# Patient Record
Sex: FEMALE | Race: AMERICAN INDIAN OR ALASKA NATIVE
[De-identification: names, ages, dates, MRNs, and addresses within clinical notes are randomized per-mention and may not be internally consistent; named-entity substitution may affect disease eponyms.]

---

## 2019-03-10 ENCOUNTER — HOSPITAL ENCOUNTER (EMERGENCY)
Dept: HOSPITAL 5 - ED | Age: 34
Discharge: HOME | End: 2019-03-10
Payer: COMMERCIAL

## 2019-03-10 ENCOUNTER — HOSPITAL ENCOUNTER (INPATIENT)
Dept: HOSPITAL 5 - ED | Age: 34
LOS: 3 days | Discharge: HOME | DRG: 175 | End: 2019-03-13
Attending: INTERNAL MEDICINE | Admitting: INTERNAL MEDICINE
Payer: COMMERCIAL

## 2019-03-10 VITALS — DIASTOLIC BLOOD PRESSURE: 56 MMHG | SYSTOLIC BLOOD PRESSURE: 114 MMHG

## 2019-03-10 DIAGNOSIS — Z71.6: ICD-10-CM

## 2019-03-10 DIAGNOSIS — J20.9: ICD-10-CM

## 2019-03-10 DIAGNOSIS — I26.99: Primary | ICD-10-CM

## 2019-03-10 DIAGNOSIS — J96.00: ICD-10-CM

## 2019-03-10 DIAGNOSIS — Z82.49: ICD-10-CM

## 2019-03-10 DIAGNOSIS — J20.9: Primary | ICD-10-CM

## 2019-03-10 DIAGNOSIS — D72.829: ICD-10-CM

## 2019-03-10 DIAGNOSIS — J68.3: ICD-10-CM

## 2019-03-10 DIAGNOSIS — Z79.899: ICD-10-CM

## 2019-03-10 DIAGNOSIS — F17.200: ICD-10-CM

## 2019-03-10 LAB
ALBUMIN SERPL-MCNC: 3.8 G/DL (ref 3.9–5)
ALT SERPL-CCNC: 17 UNITS/L (ref 7–56)
APTT BLD: 28.8 SEC. (ref 24.2–36.6)
BASOPHILS # (AUTO): 0 K/MM3 (ref 0–0.1)
BASOPHILS NFR BLD AUTO: 0.8 % (ref 0–1.8)
BUN SERPL-MCNC: 8 MG/DL (ref 7–17)
BUN/CREAT SERPL: 10 %
CALCIUM SERPL-MCNC: 9 MG/DL (ref 8.4–10.2)
EOSINOPHIL # BLD AUTO: 0.2 K/MM3 (ref 0–0.4)
EOSINOPHIL NFR BLD AUTO: 2.7 % (ref 0–4.3)
HCT VFR BLD CALC: 42.8 % (ref 30.3–42.9)
HEMOLYSIS INDEX: 14
HGB BLD-MCNC: 14.7 GM/DL (ref 10.1–14.3)
INR PPP: 0.97 (ref 0.87–1.13)
LYMPHOCYTES # BLD AUTO: 2.5 K/MM3 (ref 1.2–5.4)
LYMPHOCYTES NFR BLD AUTO: 42.1 % (ref 13.4–35)
MCHC RBC AUTO-ENTMCNC: 34 % (ref 30–34)
MCV RBC AUTO: 94 FL (ref 79–97)
MONOCYTES # (AUTO): 0.4 K/MM3 (ref 0–0.8)
MONOCYTES % (AUTO): 6.1 % (ref 0–7.3)
PLATELET # BLD: 251 K/MM3 (ref 140–440)
RBC # BLD AUTO: 4.54 M/MM3 (ref 3.65–5.03)

## 2019-03-10 PROCEDURE — 94640 AIRWAY INHALATION TREATMENT: CPT

## 2019-03-10 PROCEDURE — 85610 PROTHROMBIN TIME: CPT

## 2019-03-10 PROCEDURE — 94760 N-INVAS EAR/PLS OXIMETRY 1: CPT

## 2019-03-10 PROCEDURE — 80048 BASIC METABOLIC PNL TOTAL CA: CPT

## 2019-03-10 PROCEDURE — 81001 URINALYSIS AUTO W/SCOPE: CPT

## 2019-03-10 PROCEDURE — 85730 THROMBOPLASTIN TIME PARTIAL: CPT

## 2019-03-10 PROCEDURE — 71045 X-RAY EXAM CHEST 1 VIEW: CPT

## 2019-03-10 PROCEDURE — 93005 ELECTROCARDIOGRAM TRACING: CPT

## 2019-03-10 PROCEDURE — 84484 ASSAY OF TROPONIN QUANT: CPT

## 2019-03-10 PROCEDURE — 99406 BEHAV CHNG SMOKING 3-10 MIN: CPT

## 2019-03-10 PROCEDURE — 36415 COLL VENOUS BLD VENIPUNCTURE: CPT

## 2019-03-10 PROCEDURE — 80053 COMPREHEN METABOLIC PANEL: CPT

## 2019-03-10 PROCEDURE — 96366 THER/PROPH/DIAG IV INF ADDON: CPT

## 2019-03-10 PROCEDURE — 93010 ELECTROCARDIOGRAM REPORT: CPT

## 2019-03-10 PROCEDURE — 85379 FIBRIN DEGRADATION QUANT: CPT

## 2019-03-10 PROCEDURE — 99284 EMERGENCY DEPT VISIT MOD MDM: CPT

## 2019-03-10 PROCEDURE — 85025 COMPLETE CBC W/AUTO DIFF WBC: CPT

## 2019-03-10 PROCEDURE — 94644 CONT INHLJ TX 1ST HOUR: CPT

## 2019-03-10 PROCEDURE — 81025 URINE PREGNANCY TEST: CPT

## 2019-03-10 PROCEDURE — 83880 ASSAY OF NATRIURETIC PEPTIDE: CPT

## 2019-03-10 PROCEDURE — 93970 EXTREMITY STUDY: CPT

## 2019-03-10 PROCEDURE — 96375 TX/PRO/DX INJ NEW DRUG ADDON: CPT

## 2019-03-10 PROCEDURE — 71275 CT ANGIOGRAPHY CHEST: CPT

## 2019-03-10 PROCEDURE — 96365 THER/PROPH/DIAG IV INF INIT: CPT

## 2019-03-10 NOTE — EMERGENCY DEPARTMENT REPORT
ED General Adult HPI





- General


Chief complaint: Chest Pain


Stated complaint: CHEST PAIN/LIGHT HEADED


Time Seen by Provider: 03/10/19 13:10


Source: patient


Mode of arrival: Ambulatory


Limitations: No Limitations





- History of Present Illness


Initial comments: 


33-year-old female with cough which is largely nonproductive for the last week. 

She complains of intermittent shortness of breath.  She has obvious wheezing.  

She states that she has not been treated for asthma in the past.  She denies 

risk factors for pulmonary embolism such as recent travel.  He said no leg pain 

or swelling.  She's had some occasional chest discomfort which is associated 

with her wheezing and transient in nature.  She did not relate any pleuritic 

pain to me.  She denies hemoptysis or even any productive sputum.  She denies 

fever or chills.





-: week(s)


Location: chest


Radiation: non-radiation


Quality: aching


Consistency: intermittent (transient and intermittent)


Improves with: none


Worsens with: none


Associated Symptoms: cough, shortness of breath


Treatments Prior to Arrival: none





- Related Data


                                  Previous Rx's











 Medication  Instructions  Recorded  Last Taken  Type


 


Amoxicillin/K Clav Tab [Augmentin 1 tab PO Q12HR #14 tab 10/26/18 Unknown Rx





875 mg]    


 


Ibuprofen [Motrin] 800 mg PO Q8HR #30 tablet 10/26/18 Unknown Rx


 


Albuterol Sulfate [Ventolin HFA] 2 puff IH Q4H PRN #1 hfa.aer.ad 03/10/19 U

nknown Rx


 


Azithromycin [Zithromax] 250 mg PO DAILY #6 tablet 03/10/19 Unknown Rx


 


predniSONE [Deltasone] 60 mg PO QDAY #15 tab 03/10/19 Unknown Rx











                                    Allergies











Allergy/AdvReac Type Severity Reaction Status Date / Time


 


No Known Allergies Allergy   Verified 03/10/19 12:54














ED Review of Systems


ROS: 


Stated complaint: CHEST PAIN/LIGHT HEADED


Other details as noted in HPI





Constitutional: denies: chills, fever


Eyes: denies: eye pain, eye discharge, vision change


ENT: denies: ear pain, throat pain


Respiratory: cough, shortness of breath, wheezing


Cardiovascular: chest pain.  denies: palpitations


Endocrine: no symptoms reported


Gastrointestinal: denies: abdominal pain, nausea, diarrhea


Genitourinary: denies: urgency, dysuria, discharge


Musculoskeletal: as per HPI.  denies: back pain, joint swelling, arthralgia, 

myalgia


Skin: denies: rash, lesions


Neurological: denies: headache, weakness, paresthesias


Psychiatric: denies: anxiety, depression


Hematological/Lymphatic: denies: easy bleeding, easy bruising





ED Past Medical Hx





- Past Medical History


Previous Medical History?: No





- Surgical History


Past Surgical History?: No





- Social History


Smoking Status: Current Every Day Smoker


Substance Use Type: None





- Medications


Home Medications: 


                                Home Medications











 Medication  Instructions  Recorded  Confirmed  Last Taken  Type


 


Amoxicillin/K Clav Tab [Augmentin 1 tab PO Q12HR #14 tab 10/26/18  Unknown Rx





875 mg]     


 


Ibuprofen [Motrin] 800 mg PO Q8HR #30 tablet 10/26/18  Unknown Rx


 


Albuterol Sulfate [Ventolin HFA] 2 puff IH Q4H PRN #1 hfa.aer.ad 03/10/19  

Unknown Rx


 


Azithromycin [Zithromax] 250 mg PO DAILY #6 tablet 03/10/19  Unknown Rx


 


predniSONE [Deltasone] 60 mg PO QDAY #15 tab 03/10/19  Unknown Rx














ED Physical Exam





- General


Limitations: No Limitations


General appearance: alert, in no apparent distress





- Head


Head exam: Present: atraumatic, normocephalic





- Eye


Eye exam: Present: normal appearance.  Absent: scleral icterus





- ENT


ENT exam: Present: mucous membranes moist





- Neck


Neck exam: Present: normal inspection.  Absent: tenderness, meningismus





- Respiratory


Respiratory exam: Present: wheezes.  Absent: normal lung sounds bilaterally, 

respiratory distress





- Cardiovascular


Cardiovascular Exam: Present: regular rate, normal rhythm.  Absent: systolic 

murmur, diastolic murmur, rubs, gallop





- GI/Abdominal


GI/Abdominal exam: Present: soft, normal bowel sounds.  Absent: distended, 

tenderness, guarding, rebound, rigid





- Extremities Exam


Extremities exam: Present: normal inspection





- Back Exam


Back exam: Present: normal inspection





- Neurological Exam


Neurological exam: Present: alert, oriented X3, CN II-XII intact.  Absent: motor

 sensory deficit





- Psychiatric


Psychiatric exam: Present: normal affect, normal mood





- Skin


Skin exam: Present: warm, dry, intact, normal color.  Absent: rash





ED Course


                                   Vital Signs











  03/10/19 03/10/19 03/10/19





  13:11 13:55 14:23


 


Temperature 97.9 F  


 


Pulse Rate 115 H  


 


Pulse Rate [  97 H 





Anterior   





Bilateral   





Throughout]   


 


Respiratory 22  24





Rate   


 


Respiratory  20 





Rate [Anterior   





Bilateral   





Throughout]   


 


Blood Pressure 119/61  


 


O2 Sat by Pulse 95  





Oximetry   














- Reevaluation(s)


Reevaluation #1: 


Patient denies chest pain independent from her wheezing.  She didn't experience 

any chest pain while here.  I do not think she qualifies for CT imaging with the

d-dimer measurement found.  Her wheezing has improved and her symptoms have 

essentially resolved.  She has minimal residual wheezing but does not desire 

another treatment.  She states ready for discharge.


03/10/19 15:19








ED Medical Decision Making





- Lab Data


Result diagrams: 


                                 03/10/19 13:53





                                 03/10/19 13:41








                         Laboratory Results - last 24 hr











  03/10/19 03/10/19 03/10/19





  13:41 13:41 13:53


 


WBC    5.8


 


RBC    4.54


 


Hgb    14.7 H


 


Hct    42.8


 


MCV    94


 


MCH    32


 


MCHC    34


 


RDW    13.0 L


 


Plt Count    251


 


Lymph % (Auto)    42.1 H


 


Mono % (Auto)    6.1


 


Eos % (Auto)    2.7


 


Baso % (Auto)    0.8


 


Lymph #    2.5


 


Mono #    0.4


 


Eos #    0.2


 


Baso #    0.0


 


Seg Neutrophils %    48.3


 


Seg Neutrophils #    2.8


 


PT  13.5  


 


INR  0.97  


 


APTT  28.8  


 


D-Dimer  247.77 H  


 


Sodium   140 


 


Potassium   3.9 


 


Chloride   104.9 


 


Carbon Dioxide   25 


 


Anion Gap   14 


 


BUN   8 


 


Creatinine   0.8 


 


Estimated GFR   > 60 


 


BUN/Creatinine Ratio   10 


 


Glucose   93 


 


Calcium   9.0 


 


Total Bilirubin   0.30 


 


AST   15 


 


ALT   17 


 


Alkaline Phosphatase   60 


 


Troponin T   < 0.010 


 


Total Protein   6.7 


 


Albumin   3.8 L 


 


Albumin/Globulin Ratio   1.3 














  03/10/19





  13:53


 


WBC 


 


RBC 


 


Hgb 


 


Hct 


 


MCV 


 


MCH 


 


MCHC 


 


RDW 


 


Plt Count 


 


Lymph % (Auto) 


 


Mono % (Auto) 


 


Eos % (Auto) 


 


Baso % (Auto) 


 


Lymph # 


 


Mono # 


 


Eos # 


 


Baso # 


 


Seg Neutrophils % 


 


Seg Neutrophils # 


 


PT 


 


INR 


 


APTT 


 


D-Dimer  245.44 H


 


Sodium 


 


Potassium 


 


Chloride 


 


Carbon Dioxide 


 


Anion Gap 


 


BUN 


 


Creatinine 


 


Estimated GFR 


 


BUN/Creatinine Ratio 


 


Glucose 


 


Calcium 


 


Total Bilirubin 


 


AST 


 


ALT 


 


Alkaline Phosphatase 


 


Troponin T 


 


Total Protein 


 


Albumin 


 


Albumin/Globulin Ratio 














- EKG Data


-: EKG Interpreted by Me


EKG shows normal: sinus rhythm, axis, intervals, QRS complexes, ST-T waves


Rate: tachycardia





- EKG Data


Interpretation: nonspecific ST-T wave danilo





- Radiology Data


Radiology results: report reviewed


Chest x-ray no acute process





Critical care attestation.: 


If time is entered above; I have spent that time in minutes in the direct care 

of this critically ill patient, excluding procedure time.








ED Disposition


Clinical Impression: 


 Reactive airways dysfunction syndrome





Acute bronchitis


Qualifiers:


 Bronchitis organism: unspecified organism Qualified Code(s): J20.9 - Acute 

bronchitis, unspecified





Disposition: DC-01 TO HOME OR SELFCARE


Is pt being admited?: No


Does the pt Need Aspirin: No


Condition: Stable


Instructions:  Bronchospasm (ED), Acute Bronchitis (ED)


Additional Instructions: 


Return to the emergency department for any acute change or worsening symptoms.  

Follow-up with primary care provider.  Rx has directed.


Prescriptions: 


predniSONE [Deltasone] 60 mg PO QDAY #15 tab


Albuterol Sulfate [Ventolin HFA] 2 puff IH Q4H PRN #1 hfa.aer.ad


 PRN Reason: Shortness Of Breath


Azithromycin [Zithromax] 250 mg PO DAILY #6 tablet


Referrals: 


[facility], [place] [Primary Care Provider] - 2-3 Days


Time of Disposition: 15:21

## 2019-03-10 NOTE — EMERGENCY DEPARTMENT REPORT
Blank Doc





- Documentation


Documentation: 





This is a 33-year-old female that presents with shortness of breathe and chest 

pain.  





Sat at 93-94% in triage.





This initial assessment/diagnostic orders/clinical plan/treatment(s) is/are 

subject to change based on patient's health status, clinical progression and 

re-assessment by fellow clinical providers in the ED.  Further treatment and 

workup at subsequent clinical providers discretion.  Patient/guardians urged not

to elope from the ED as their condition may be serious if not clinically 

assessed and managed.  Initial orders include:


1- Patient sent to MAIN for further evaluation and treatment


2- Breathing treatment/steroids


3- Labs


4- EKG


5- CXR

## 2019-03-10 NOTE — EMERGENCY DEPARTMENT REPORT
HPI





- General


Chief Complaint: Dyspnea/Respdistress


Time Seen by Provider: 03/10/19 23:40





- HPI


HPI: 





33 -American female presents to the ED with shortness of breath, 

wheezing, that started 1 hour prior to arrival.  Patient rates symptoms as 

severe in severity.  Patient was seen earlier in the ED and was diagnosed with 

bronchitis, she states she was walking in to [facility] to get her prescriptions and 

suddenly felt out of breath, very fatigued, chest discomfort, and like she was 

going to pass out.  She drove herself to ED, parked in front of the ambulance 

entrance, and proceeded to the ED where she was met by nurses, and promptly 

placed in to a room.  She appeared to be in respiratory distress, heart rate in 

the 140s, sats in the low 90s, and the wheezing in all lobes.  Respiratory was 

called, patient was placed in the DuoNeb 10 mg albuterol and 1 mg Atrovent, she 

was given Solu-Medrol 125 IV, and placed on 2 L of oxygen.





ED Past Medical Hx





- Social History


Smoking Status: Current Every Day Smoker


Substance Use Type: None





- Medications


Home Medications: 


                                Home Medications











 Medication  Instructions  Recorded  Confirmed  Last Taken  Type


 


Amoxicillin/K Clav Tab [Augmentin 1 tab PO Q12HR #14 tab 10/26/18 03/11/19 

Unknown Rx





875 mg]     


 


Ibuprofen [Motrin] 800 mg PO Q8HR #30 tablet 10/26/18 03/11/19 Unknown Rx


 


Albuterol Sulfate [Ventolin HFA] 2 puff IH Q4H PRN #1 hfa.aer.ad 03/10/19 

03/11/19 Unknown Rx


 


Azithromycin [Zithromax] 250 mg PO DAILY #6 tablet 03/10/19 03/11/19 Unknown Rx


 


predniSONE [Deltasone] 60 mg PO QDAY #15 tab 03/10/19 03/11/19 Unknown Rx














ED Review of Systems


ROS: 


Stated complaint: YONNY


Other details as noted in HPI








ED Medical Decision Making





- Lab Data


Result diagrams: 


                                 03/11/19 00:48





                                 03/11/19 00:48





- Medical Decision Making





33-year-old female presents to the ED with respiratory distress





Treated with DuoNeb with 10 mg of albuterol and 1 mg of Atrovent, 2 mg IV of 

magnesium, CT angiogram of the chest showed right lower lobe PE, patient treated

 with Lovenox 100 mg subcutaneous 1.  Patient To be admitted to ICU.





- Differential Diagnosis


ACS, PE, pneumonia, bronchitis


Critical Care Time: Yes (one hour)


Critical care time in (mins) excluding proc time.: 60


Critical care attestation.: 


If time is entered above; I have spent that time in minutes in the direct care 

of this critically ill patient, excluding procedure time.








ED Disposition


Clinical Impression: 


Pulmonary embolism


Qualifiers:


 Pulmonary embolism type: other Chronicity: acute Acute cor pulmonale presence: 

without acute cor pulmonale Qualified Code(s): I26.99 - Other pulmonary embolism

 without acute cor pulmonale





Disposition: DC-09 OP ADMIT IP TO THIS HOSP


Is pt being admited?: Yes


Does the pt Need Aspirin: Yes


Condition: Critical

## 2019-03-10 NOTE — XRAY REPORT
EXAM:   XR CHEST 1V AP 

 

HISTORY:  YONNY 

 

TECHNIQUE: PA and lateral chest x-ray dated 3/10/2019 at 1:50 PM. 

 

COMPARISON:  None available. 

 

FINDINGS: 

 

The heart size and mediastinum are within normal limits.  The lung fields and costophrenic angles are
 clear.  There is no acute parenchymal infiltrate, pleural effusion, or pneumothorax seen.  The visua
lized bony structures are within normal limits. 

 

IMPRESSION: 

 

1.  No evidence for acute cardiopulmonary disease seen. 

 

  

 

This document is electronically signed by Yolanda Parsons MD., March 10 2019 02:15:18 PM ET

## 2019-03-11 LAB
APTT BLD: 26.7 SEC. (ref 24.2–36.6)
BACTERIA #/AREA URNS HPF: (no result) /HPF
BASOPHILS # (AUTO): 0 K/MM3 (ref 0–0.1)
BASOPHILS NFR BLD AUTO: 0.2 % (ref 0–1.8)
BILIRUB UR QL STRIP: (no result)
BLOOD UR QL VISUAL: (no result)
BUN SERPL-MCNC: 8 MG/DL (ref 7–17)
BUN/CREAT SERPL: 11 %
CALCIUM SERPL-MCNC: 8.9 MG/DL (ref 8.4–10.2)
EOSINOPHIL # BLD AUTO: 0 K/MM3 (ref 0–0.4)
EOSINOPHIL NFR BLD AUTO: 0 % (ref 0–4.3)
HCT VFR BLD CALC: 43 % (ref 30.3–42.9)
HEMOLYSIS INDEX: 13
HGB BLD-MCNC: 14.8 GM/DL (ref 10.1–14.3)
INR PPP: 0.92 (ref 0.87–1.13)
LYMPHOCYTES # BLD AUTO: 1.1 K/MM3 (ref 1.2–5.4)
LYMPHOCYTES NFR BLD AUTO: 9.9 % (ref 13.4–35)
MCHC RBC AUTO-ENTMCNC: 34 % (ref 30–34)
MCV RBC AUTO: 95 FL (ref 79–97)
MONOCYTES # (AUTO): 0.2 K/MM3 (ref 0–0.8)
MONOCYTES % (AUTO): 1.8 % (ref 0–7.3)
MUCOUS THREADS #/AREA URNS HPF: (no result) /HPF
PH UR STRIP: 6 [PH] (ref 5–7)
PLATELET # BLD: 280 K/MM3 (ref 140–440)
PROT UR STRIP-MCNC: (no result) MG/DL
RBC # BLD AUTO: 4.54 M/MM3 (ref 3.65–5.03)
RBC #/AREA URNS HPF: 3 /HPF (ref 0–6)
UROBILINOGEN UR-MCNC: < 2 MG/DL (ref ?–2)
WBC #/AREA URNS HPF: 1 /HPF (ref 0–6)

## 2019-03-11 PROCEDURE — 5A09357 ASSISTANCE WITH RESPIRATORY VENTILATION, LESS THAN 24 CONSECUTIVE HOURS, CONTINUOUS POSITIVE AIRWAY PRESSURE: ICD-10-PCS | Performed by: INTERNAL MEDICINE

## 2019-03-11 RX ADMIN — MORPHINE SULFATE PRN MG: 2 INJECTION, SOLUTION INTRAMUSCULAR; INTRAVENOUS at 09:19

## 2019-03-11 RX ADMIN — MORPHINE SULFATE PRN MG: 2 INJECTION, SOLUTION INTRAMUSCULAR; INTRAVENOUS at 18:42

## 2019-03-11 RX ADMIN — ENOXAPARIN SODIUM SCH MG: 100 INJECTION SUBCUTANEOUS at 06:15

## 2019-03-11 RX ADMIN — IPRATROPIUM BROMIDE SCH MG: 0.5 SOLUTION RESPIRATORY (INHALATION) at 13:53

## 2019-03-11 RX ADMIN — Medication SCH ML: at 22:08

## 2019-03-11 RX ADMIN — IPRATROPIUM BROMIDE SCH MG: 0.5 SOLUTION RESPIRATORY (INHALATION) at 09:51

## 2019-03-11 RX ADMIN — ENOXAPARIN SODIUM SCH MG: 100 INJECTION SUBCUTANEOUS at 18:42

## 2019-03-11 RX ADMIN — Medication SCH ML: at 09:20

## 2019-03-11 RX ADMIN — IPRATROPIUM BROMIDE SCH MG: 0.5 SOLUTION RESPIRATORY (INHALATION) at 16:39

## 2019-03-11 RX ADMIN — IPRATROPIUM BROMIDE SCH: 0.5 SOLUTION RESPIRATORY (INHALATION) at 21:55

## 2019-03-11 NOTE — HISTORY AND PHYSICAL REPORT
History of Present Illness


Date of examination: 03/11/19


History of present illness: 


33 -year-old woman with no medical problem, she is a  comes 

emergency room with complaints of shortness of breath and a nonproductive cough.

 She was seen out in the emergency room yesterday and was diagnosed with acute 

bronchitis.  She went to [facility] to  her prednisone, she became increasing 

more short of breath, feel as if she was, pass out.  She comes emergency room 

her evaluation, she was found to be in respiratory distress, wheezing, she will 

space on BiPAP, given steroids.  She denies OCP use, recent surgery, she is 

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


Review of systems


Constitutional: no  weight loss, chills, fever


Ears, eyes, nose, mouth and throat: no nasal congestion, no nasal discharge, no 

sinus pressure, no vision change, no red eye.


Neck: No neck pain or rigidity.


Cardiovascular: no palpitations, chest pain


Respiratory: + cough, shortness of breath


Gastrointestinal: no hematochezia, abdominal pain


Genitourinary : no  frequency , no hematuria


Musculoskeletal: no joint swelling or muscle ache 


Integumentary: no rash, no pruritis


Neurological: no parathesias, no focal weakness


Endocrine: no cold or heat intolerance, no polyuria or polydipsia


Hematologic/Lymphatic: no easy bruising, no easy bleeding, no gland swelling


Allergic/Immunologic: no urticaria, no angioedema.





PAST MEDICAL HISTORY: None





PAST SURGICAL HISTORY: Hernia repair





SOCIAL HISTORY: Denies alcohol,  drugs, smokes half pack a day





FAMILY HISTORY: Hypertension








Medications and Allergies


                                    Allergies











Allergy/AdvReac Type Severity Reaction Status Date / Time


 


No Known Allergies Allergy   Verified 03/10/19 12:54











                                Home Medications











 Medication  Instructions  Recorded  Confirmed  Last Taken  Type


 


Amoxicillin/K Clav Tab [Augmentin 1 tab PO Q12HR #14 tab 10/26/18 03/11/19 

Unknown Rx





875 mg]     


 


Ibuprofen [Motrin] 800 mg PO Q8HR #30 tablet 10/26/18 03/11/19 Unknown Rx


 


Albuterol Sulfate [Ventolin HFA] 2 puff IH Q4H PRN #1 hfa.aer.ad 03/10/19 

03/11/19 Unknown Rx


 


Azithromycin [Zithromax] 250 mg PO DAILY #6 tablet 03/10/19 03/11/19 Unknown Rx


 


predniSONE [Deltasone] 60 mg PO QDAY #15 tab 03/10/19 03/11/19 Unknown Rx














Exam





- Physical Exam


Narrative exam: 


General Apperance: The patient lying in bed,  breathing comfortable on BIPAP


HEENT: Normocephalic, atraumatic.  Pupils equally round and reactive to light, 

EOMI, no sclericterus or JVD or thyromegaly or nodule.  , no carotid bruit, 

mucous membranes moist, no exudate or erythema


Heart: S1-S2, regular is rhythm


Lungs: Clear to auscultation bilaterally, breathing comfortable


Abdomen: Positive bowel sounds, soft, nontender, nondistended, no organomegaly


Extremities: No edema cyanosis clubbing


Skin: no rash, nodule, warm and dry


Neuro: cranial nerves 2-12 intact, speech is fluent, motor/sensory intact








- Constitutional


Vitals: 


                                        











Temp Pulse Resp BP Pulse Ox


 


    114 H  22   113/54   100 


 


    03/11/19 02:25  03/11/19 02:25  03/11/19 02:00  03/11/19 02:12














Results





- Labs


CBC & Chem 7: 


                                 03/11/19 00:48





                                 03/11/19 00:48


Labs: 


                              Abnormal lab results











  03/11/19 03/11/19 Range/Units





  00:48 00:48 


 


Hgb  14.8 H   (10.1-14.3)  gm/dl


 


Hct  43.0 H   (30.3-42.9)  %


 


MCH  33 H   (28-32)  pg


 


Lymph % (Auto)  9.9 L   (13.4-35.0)  %


 


Lymph #  1.1 L   (1.2-5.4)  K/mm3


 


Seg Neutrophils %  88.1 H   (40.0-70.0)  %


 


Seg Neutrophils #  9.5 H   (1.8-7.7)  K/mm3


 


Sodium   136 L  (137-145)  mmol/L


 


Carbon Dioxide   18 L D  (22-30)  mmol/L


 


Glucose   169 H  ()  mg/dL














- Imaging and Cardiology


CT scan - chest: report reviewed





Assessment and Plan


Assessment


Acute pulmonary emboli


Acute bronchitis


Plan


Admit to medicine


Start Lovenox, check Doppler lower extremity


start Steroids, nebulized treatments


DVT prophylaxis

## 2019-03-11 NOTE — VASCULAR LAB REPORT
PROCEDURE: VL VENOUS DUPLEX LE BILAT 

 

TECHNIQUE:  Duplex Doppler imaging of the veins of the bilateral lower extremities was performed. 

 

HISTORY: DVT 

 

COMPARISONS: None.  

 

FINDINGS: 

 

The veins of the right lower extremity are patent, compressible, demonstrate normal waveforms and aug
mentation. 

 

The veins of the left lower extremity are patent, compressible, demonstrate normal waveforms and augm
entation. 

 

IMPRESSION:  

 

No evidence of deep venous fibrosis of the bilateral lower extremities. 

 

This document is electronically signed by Sheridan Beltrán MD., March 11 2019 12:23:13 PM ET

## 2019-03-11 NOTE — CONSULTATION
History of Present Illness


Consult date: 03/11/19


Reason for consult: dyspnea, cough, pulmonary embolism


History of present illness: 





                                   PULMONARY AND CRITICAL CARE CONSULTATION





DR. SOUZA THANK YOU FOR ASKING US TO PARTICIPATE  IN THE CARE OF THIS PATIENT.








33 -year-old woman with no medical problem, she is a  comes 

emergency room with complaints of shortness of breath and a nonproductive cough.

 She was seen out in the emergency room yesterday and was diagnosed with acute 

bronchitis.  She went to [facility] to  her prednisone, she became increasing 

more short of breath, feel as if she was, pass out.  She comes emergency room 

her evaluation, she was found to be in respiratory distress, wheezing, she is 

placed on BiPAP and given steroids.  She denies OCP use, recent surgery, she is 

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 

truck.Patient denies any other medical problems except migraine headache. She 

smokes Half a pack a day x 10 years. Counselled to stop smoking.Denies alcohol 

or drug abuse. No known drug allergies. Not . No children. Do not use 

Birth control pills.


Patient has Angio CT of chest  reported pulmonary emboli right Lower lobe.


Patient started on S/C Lovenox with therapeutic dose.


Patient resting on 2 litres O2. O2 saturation 98%.





Past History


Past Medical History: other (Migrain hedaches.)





Medications and Allergies


                                    Allergies











Allergy/AdvReac Type Severity Reaction Status Date / Time


 


No Known Allergies Allergy   Verified 03/10/19 12:54











                                Home Medications











 Medication  Instructions  Recorded  Confirmed  Last Taken  Type


 


Amoxicillin/K Clav Tab [Augmentin 1 tab PO Q12HR #14 tab 10/26/18 03/11/19 

Unknown Rx





875 mg]     


 


Ibuprofen [Motrin] 800 mg PO Q8HR #30 tablet 10/26/18 03/11/19 Unknown Rx


 


Albuterol Sulfate [Ventolin HFA] 2 puff IH Q4H PRN #1 hfa.aer.ad 03/10/19 

03/11/19 Unknown Rx


 


Azithromycin [Zithromax] 250 mg PO DAILY #6 tablet 03/10/19 03/11/19 Unknown Rx


 


predniSONE [Deltasone] 60 mg PO QDAY #15 tab 03/10/19 03/11/19 Unknown Rx











Active Meds: 


Active Medications





Acetaminophen (Tylenol)  650 mg PO Q4H PRN


   PRN Reason: Pain MILD(1-3)/Fever >100.5/HA


   Last Admin: 03/11/19 12:20 Dose:  650 mg


   Documented by: 


Enoxaparin Sodium (Lovenox)  90 mg 1 mg/kg (90 mg) SUB-Q Q12H [facility]


   Last Admin: 03/11/19 06:15 Dose:  90 mg


   Documented by: 


Ipratropium Bromide (Atrovent)  0.5 mg IH Q4HRT [facility]


   Last Admin: 03/11/19 16:39 Dose:  0.5 mg


   Documented by: 


Methylprednisolone Sodium Succinate (Solu-Medrol)  80 mg IV Q6HR [facility]


   Last Admin: 03/11/19 12:19 Dose:  80 mg


   Documented by: 


Morphine Sulfate (Morphine)  2 mg IV Q4H PRN


   PRN Reason: Pain, Moderate (4-6)


   Last Admin: 03/11/19 09:19 Dose:  2 mg


   Documented by: 


Ondansetron HCl (Zofran)  4 mg IV Q8H PRN


   PRN Reason: Nausea And Vomiting


Sodium Chloride (Sodium Chloride Flush Syringe 10 Ml)  10 ml IV BID [facility]


   Last Admin: 03/11/19 09:20 Dose:  10 ml


   Documented by: 


Sodium Chloride (Sodium Chloride Flush Syringe 10 Ml)  10 ml IV PRN PRN


   PRN Reason: LINE FLUSH











Review of Systems


All systems: negative





Physical Examination


Vital signs: 


                                   Vital Signs











Pulse Pulse Ox


 


 156 H  96 


 


 03/10/19 23:28  03/10/19 23:28











General appearance: no acute distress, asleep


Eyes: non-icteric


ENT: oropharynx moist


Neck: supple, no JVD


Ascultation: Bilateral: diminished breath sounds


Cardiovascular: regular rate and rhythm


Gastrointestinal: normoactive bowel sounds, soft, non-tender


Integumentary: normal


Extremities: no cyanosis, no edema


Musculoskeletal: no deformities


non-focal exam, pupils equal and round, CN II-XII normal


depressed





Results





- Laboratory Findings


CBC and BMP: 


                                 03/11/19 00:48





                                 03/11/19 00:48


PT/INR, D-dimer











PT  12.9 Sec. (12.2-14.9)   03/11/19  04:31    


 


INR  0.92  (0.87-1.13)   03/11/19  04:31    








Abnormal lab findings: 


                                  Abnormal Labs











  03/11/19 03/11/19





  00:48 00:48


 


Hgb  14.8 H 


 


Hct  43.0 H 


 


MCH  33 H 


 


Lymph % (Auto)  9.9 L 


 


Lymph #  1.1 L 


 


Seg Neutrophils %  88.1 H 


 


Seg Neutrophils #  9.5 H 


 


Sodium   136 L


 


Carbon Dioxide   18 L D


 


Glucose   169 H














- Diagnostic Findings


CT scan - chest: report reviewed (Angio CT of chest reported right lower lobe 

pulmonary embolism.), image reviewed


[place]: report reviewed (No DVT reported.)





Assessment and Plan





33 -year-old woman with no medical problem, she is a  comes 

emergency room with complaints of shortness of breath and a nonproductive cough.

  She was seen out in the emergency room yesterday and was diagnosed with acute 

bronchitis.  She went to [facility] to  her prednisone, she became increasing mo

re short of breath, feel as if she was, pass out.  She comes emergency room her 

evaluation, she was found to be in respiratory distress, wheezing, she is placed

 on BiPAP and given steroids.  She denies OCP use, recent surgery, she is 

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 

truck.Patient denies any other medical problems except migraine headache. She 

smokes Half a pack a day x 10 years. Counselled to stop smoking.Denies alcohol 

or drug abuse. No known drug allergies. Not . No children. Do not use 

Birth control pills.


Patient has Angio CT of chest  reported pulmonary emboli right Lower lobe.


Patient started on S/C Lovenox with therapeutic dose.


Patient resting on 2 litres O2. O2 saturation 98%.





- Patient Problems


(1) Pulmonary embolism


Current Visit: Yes   Status: Acute   


Qualifiers: 


   Pulmonary embolism type: other   Chronicity: acute   Acute cor pulmonale 

presence: without acute cor pulmonale   Qualified Code(s): I26.99 - Other 

pulmonary embolism without acute cor pulmonale   


Plan to address problem: 


Patient started on S/C Lovenox.








(2) Reactive airways dysfunction syndrome


Current Visit: No   Status: Acute   


Plan to address problem: 


Atrovent aerosol treatments q 6 hours.


 Continue I/V solumedrol.

## 2019-03-11 NOTE — CONSULTATION
History of Present Illness


Consult date: 03/11/19


Requesting physician: KIM SOUZA


Reason for consult: other (Acute Hypoxemic Respiratory Failure)


History of present illness: 





PCCM CONSULT NOTE (Full note dictated # )





Please see dictated notes for full details





Medications and Allergies


                                    Allergies











Allergy/AdvReac Type Severity Reaction Status Date / Time


 


No Known Allergies Allergy   Verified 03/10/19 12:54











                                Home Medications











 Medication  Instructions  Recorded  Confirmed  Last Taken  Type


 


Amoxicillin/K Clav Tab [Augmentin 1 tab PO Q12HR #14 tab 10/26/18 03/11/19 

Unknown Rx





875 mg]     


 


Ibuprofen [Motrin] 800 mg PO Q8HR #30 tablet 10/26/18 03/11/19 Unknown Rx


 


Albuterol Sulfate [Ventolin HFA] 2 puff IH Q4H PRN #1 hfa.aer.ad 03/10/19 

03/11/19 Unknown Rx


 


Azithromycin [Zithromax] 250 mg PO DAILY #6 tablet 03/10/19 03/11/19 Unknown Rx


 


predniSONE [Deltasone] 60 mg PO QDAY #15 tab 03/10/19 03/11/19 Unknown Rx











Active Meds: 


Active Medications





Acetaminophen (Tylenol)  650 mg PO Q4H PRN


   PRN Reason: Pain MILD(1-3)/Fever >100.5/HA


Enoxaparin Sodium (Lovenox)  90 mg 1 mg/kg (90 mg) SUB-Q Q12H [facility]


   Last Admin: 03/11/19 06:15 Dose:  90 mg


   Documented by: 


Ipratropium Bromide (Atrovent)  0.5 mg IH Q4HRT [facility]


   Last Admin: 03/11/19 09:51 Dose:  0.5 mg


   Documented by: 


Methylprednisolone Sodium Succinate (Solu-Medrol)  80 mg IV Q6HR [facility]


Morphine Sulfate (Morphine)  2 mg IV Q4H PRN


   PRN Reason: Pain, Moderate (4-6)


   Last Admin: 03/11/19 09:19 Dose:  2 mg


   Documented by: 


Ondansetron HCl (Zofran)  4 mg IV Q8H PRN


   PRN Reason: Nausea And Vomiting


Sodium Chloride (Sodium Chloride Flush Syringe 10 Ml)  10 ml IV BID [facility]


   Last Admin: 03/11/19 09:20 Dose:  10 ml


   Documented by: 


Sodium Chloride (Sodium Chloride Flush Syringe 10 Ml)  10 ml IV PRN PRN


   PRN Reason: LINE FLUSH











Physical Examination


Vital signs: 


                                   Vital Signs











Pulse Pulse Ox


 


 156 H  96 


 


 03/10/19 23:28  03/10/19 23:28














Results





- Laboratory Findings


CBC and BMP: 


                                 03/11/19 00:48





                                 03/11/19 00:48


PT/INR, D-dimer











PT  12.9 Sec. (12.2-14.9)   03/11/19  04:31    


 


INR  0.92  (0.87-1.13)   03/11/19  04:31    








Abnormal lab findings: 


                                  Abnormal Labs











  03/11/19 03/11/19





  00:48 00:48


 


Hgb  14.8 H 


 


Hct  43.0 H 


 


MCH  33 H 


 


Lymph % (Auto)  9.9 L 


 


Lymph #  1.1 L 


 


Seg Neutrophils %  88.1 H 


 


Seg Neutrophils #  9.5 H 


 


Sodium   136 L


 


Carbon Dioxide   18 L D


 


Glucose   169 H

## 2019-03-11 NOTE — CAT SCAN REPORT
PROCEDURE: CT ANGIO CHEST 

 

TECHNIQUE: 

 

HISTORY: chest pain,elev ddimer,sob 

 

FINDINGS: Contrast-enhanced CT angiography of the chest was performed following the intravenous admin
istration of iodinated contrast. Sagittal and coronal MIP three-dimensional reformatted images were g
enerated. 

 

These images demonstrate a pulmonary embolism in a medial branch of the right lower lobe pulmonary ar
[place], axial image 68, sagittal image 69, coronal image 68. No central embolism is seen. There is no a
ortic dissection. 

 

There is no consolidative pulmonary infiltrate. There is no pleural or pericardial effusion. 

 

In the upper abdomen the adrenal glands are within normal limits. The visualized portion of the liver
 and spleen are unremarkable. 

 

IMPRESSION: Right lower lobe pulmonary embolism 

 

This document is electronically signed by Johnson Oscar MD., March 11 2019 04:12:48 AM ET

## 2019-03-12 LAB
BASOPHILS # (AUTO): 0 K/MM3 (ref 0–0.1)
BASOPHILS NFR BLD AUTO: 0.1 % (ref 0–1.8)
BUN SERPL-MCNC: 14 MG/DL (ref 7–17)
BUN/CREAT SERPL: 20 %
CALCIUM SERPL-MCNC: 8.8 MG/DL (ref 8.4–10.2)
EOSINOPHIL # BLD AUTO: 0 K/MM3 (ref 0–0.4)
EOSINOPHIL NFR BLD AUTO: 0 % (ref 0–4.3)
HCT VFR BLD CALC: 43.1 % (ref 30.3–42.9)
HEMOLYSIS INDEX: 6
HGB BLD-MCNC: 14.2 GM/DL (ref 10.1–14.3)
LYMPHOCYTES # BLD AUTO: 1.6 K/MM3 (ref 1.2–5.4)
LYMPHOCYTES NFR BLD AUTO: 9.9 % (ref 13.4–35)
MCHC RBC AUTO-ENTMCNC: 33 % (ref 30–34)
MCV RBC AUTO: 95 FL (ref 79–97)
MONOCYTES # (AUTO): 0.4 K/MM3 (ref 0–0.8)
MONOCYTES % (AUTO): 2.7 % (ref 0–7.3)
PLATELET # BLD: 263 K/MM3 (ref 140–440)
RBC # BLD AUTO: 4.55 M/MM3 (ref 3.65–5.03)

## 2019-03-12 RX ADMIN — Medication SCH ML: at 21:44

## 2019-03-12 RX ADMIN — ENOXAPARIN SODIUM SCH MG: 100 INJECTION SUBCUTANEOUS at 17:07

## 2019-03-12 RX ADMIN — IPRATROPIUM BROMIDE SCH: 0.5 SOLUTION RESPIRATORY (INHALATION) at 00:44

## 2019-03-12 RX ADMIN — IPRATROPIUM BROMIDE SCH MG: 0.5 SOLUTION RESPIRATORY (INHALATION) at 09:12

## 2019-03-12 RX ADMIN — IPRATROPIUM BROMIDE SCH: 0.5 SOLUTION RESPIRATORY (INHALATION) at 16:07

## 2019-03-12 RX ADMIN — Medication SCH ML: at 10:59

## 2019-03-12 RX ADMIN — IPRATROPIUM BROMIDE SCH MG: 0.5 SOLUTION RESPIRATORY (INHALATION) at 21:16

## 2019-03-12 RX ADMIN — IPRATROPIUM BROMIDE SCH MG: 0.5 SOLUTION RESPIRATORY (INHALATION) at 13:34

## 2019-03-12 RX ADMIN — ENOXAPARIN SODIUM SCH MG: 100 INJECTION SUBCUTANEOUS at 06:04

## 2019-03-12 RX ADMIN — MORPHINE SULFATE PRN MG: 2 INJECTION, SOLUTION INTRAMUSCULAR; INTRAVENOUS at 06:37

## 2019-03-12 RX ADMIN — IPRATROPIUM BROMIDE SCH: 0.5 SOLUTION RESPIRATORY (INHALATION) at 03:32

## 2019-03-12 NOTE — PROGRESS NOTE
Assessment and Plan





Patient awake. Resting on 4 litres O2. O2 saturation 91%.No complaint of chest 

pain or shortness of breath. Patient afebrile and has slight cough.





- Patient Problems


(1) Pulmonary embolism


Current Visit: Yes   Status: Acute   


Qualifiers: 


   Pulmonary embolism type: other   Chronicity: acute   Acute cor pulmonale 

presence: without acute cor pulmonale   Qualified Code(s): I26.99 - Other 

pulmonary embolism without acute cor pulmonale   


Plan to address problem: 


Patient started on S/C Lovenox.








(2) Reactive airways dysfunction syndrome


Current Visit: No   Status: Acute   


Plan to address problem: 


Atrovent aerosol treatments q 6 hours.


 Continue I/V solumedrol.








Subjective


Date of service: 03/12/19


Interval history: 





Patient awake. Resting on 4 litres O2. O2 saturation 91%.No complaint of chest 

pain or shortness of breath. Patient afebrile and has slight cough.





Objective


                               Vital Signs - 12hr











  03/12/19 03/12/19 03/12/19





  07:41 08:00 09:27


 


Temperature 98.7 F  


 


Pulse Rate 92 H  


 


Pulse Rate [   





Anterior   





Bilateral   





Throughout]   


 


Pulse Rate [  118 H 106 H





Posterior   





Bilateral   





Throughout]   


 


Respiratory 18  





Rate   


 


Respiratory   





Rate [Anterior   





Bilateral   





Throughout]   


 


Respiratory  18 18





Rate [Posterior   





Bilateral   





Throughout]   


 


Blood Pressure 102/48  


 


O2 Sat by Pulse 89  





Oximetry   














  03/12/19 03/12/19 03/12/19





  09:30 10:00 10:19


 


Temperature   


 


Pulse Rate  93 H 


 


Pulse Rate [   





Anterior   





Bilateral   





Throughout]   


 


Pulse Rate [   





Posterior   





Bilateral   





Throughout]   


 


Respiratory   





Rate   


 


Respiratory   





Rate [Anterior   





Bilateral   





Throughout]   


 


Respiratory   





Rate [Posterior   





Bilateral   





Throughout]   


 


Blood Pressure   


 


O2 Sat by Pulse 95  98





Oximetry   














  03/12/19 03/12/19 03/12/19





  11:52 13:38 13:44


 


Temperature 98.7 F  


 


Pulse Rate 86  


 


Pulse Rate [   103 H





Anterior   





Bilateral   





Throughout]   


 


Pulse Rate [  101 H 





Posterior   





Bilateral   





Throughout]   


 


Respiratory 18  





Rate   


 


Respiratory   18





Rate [Anterior   





Bilateral   





Throughout]   


 


Respiratory  17 





Rate [Posterior   





Bilateral   





Throughout]   


 


Blood Pressure 101/49  


 


O2 Sat by Pulse 96  





Oximetry   














  03/12/19





  16:04


 


Temperature 98.1 F


 


Pulse Rate 101 H


 


Pulse Rate [ 





Anterior 





Bilateral 





Throughout] 


 


Pulse Rate [ 





Posterior 





Bilateral 





Throughout] 


 


Respiratory 18





Rate 


 


Respiratory 





Rate [Anterior 





Bilateral 





Throughout] 


 


Respiratory 





Rate [Posterior 





Bilateral 





Throughout] 


 


Blood Pressure 100/51


 


O2 Sat by Pulse 91





Oximetry 











Constitutional: no acute distress, alert


Eyes: non-icteric


ENT: oropharynx moist


Neck: supple, no JVD


Ascultation: Bilateral: diminished breath sounds


Cardiovascular: regular rate and rhythm


Gastrointestinal: normoactive bowel sounds, soft, non-tender


Integumentary: normal


Extremities: no cyanosis, no edema


Neurologic: normal mental status, non-focal exam, pupils equal and round, CN II-

XII normal


Psychiatric: depressed


CBC and BMP: 


                                 03/12/19 06:05





                                 03/12/19 06:05


ABG, PT/INR, D-dimer: 


PT/INR, D-dimer











PT  12.9 Sec. (12.2-14.9)   03/11/19  04:31    


 


INR  0.92  (0.87-1.13)   03/11/19  04:31    








Abnormal lab findings: 


                                  Abnormal Labs











  03/11/19 03/11/19 03/12/19





  00:48 00:48 06:05


 


WBC    16.2 H


 


Hgb  14.8 H  


 


Hct  43.0 H   43.1 H


 


MCH  33 H  


 


Lymph % (Auto)  9.9 L   9.9 L


 


Lymph #  1.1 L  


 


Seg Neutrophils %  88.1 H   87.3 H


 


Seg Neutrophils #  9.5 H   14.1 H


 


Sodium   136 L 


 


Carbon Dioxide   18 L D 


 


Glucose   169 H 














  03/12/19





  06:05


 


WBC 


 


Hgb 


 


Hct 


 


MCH 


 


Lymph % (Auto) 


 


Lymph # 


 


Seg Neutrophils % 


 


Seg Neutrophils # 


 


Sodium  135 L


 


Carbon Dioxide 


 


Glucose  112 H

## 2019-03-12 NOTE — PROGRESS NOTE
Assessment and Plan


Assessment and plan: 


33 -year-old woman with no medical problem, she is a  comes 

emergency room with complaints of shortness of breath and a nonproductive cough.

 She was seen out in the emergency room yesterday and was diagnosed with acute 

bronchitis.  She went to [facility] to  her prednisone, she became increasing 

more short of breath, feel as if she was, pass out.  She comes emergency room 

her evaluation, she was found to be in respiratory distress, wheezing, she will 

space on BiPAP, given steroids.  She denies OCP use,, recent surgery, she is 

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


She was diagnosed With Pulmonary Embolism.








CT CHEST IMPRESSION: Right lower lobe pulmonary embolism 


Doppler US. IMPRESSION: No evidence of deep venous fibrosis of the bilateral 

lower extremities. 








Acute pulmonary emboli


Acute Respiratory failure secondary to Pulmonary embolisim


Reactive airways dysfunction syndrome


Acute bronchitis


Tobacco use disorder


Leukocytosis-likely secondary to steroids








Plan


Admit to medicine


Start Lovenox, 


Pulmonary input noted


started Steroids, nebulizer treatments


Based on clinical improvement and oxygen demand will switch to oral NOAC and 

plan for discharge


Patient will need outpatient follow up with Pulmonary, Primary doctor and 

Hematologist


DVT prophylaxis














History


Interval history: 


Patient seen and examined, still with some shortness of breath and exertion 

dyspnea.








Hospitalist Physical





- Physical exam


Narrative exam: 


General Apperance: The patient lying in bed, no acute distress, no increase in 

WOB


HEENT: Normocephalic, atraumatic.  Pupils equally round and reactive to light, 

EOMI, no sclericterus or JVD or thyromegaly or nodule.  , no carotid bruit, 

mucous membranes moist, no exudate or erythema


Heart: S1-S2, regular is rhythm


Lungs: Diminshed bilaterally, breathing comfortable


Abdomen: Positive bowel sounds, soft, nontender, nondistended, no organomegaly


Extremities: No edema cyanosis clubbing


Skin: no rash, nodule, warm and dry


Neuro: cranial nerves 2-12 intact, speech is fluent, motor/sensory intact











- Constitutional


Vitals: 


                                        











Temp Pulse Resp BP Pulse Ox


 


 98.7 F   92 H  18   102/48   89 


 


 03/12/19 07:41  03/12/19 07:41  03/12/19 07:41  03/12/19 07:41  03/12/19 07:41














Results





- Labs


CBC & Chem 7: 


                                 03/12/19 06:05





                                 03/12/19 06:05


Labs: 


                             Laboratory Last Values











WBC  16.2 K/mm3 (4.5-11.0)  H  03/12/19  06:05    


 


RBC  4.55 M/mm3 (3.65-5.03)   03/12/19  06:05    


 


Hgb  14.2 gm/dl (10.1-14.3)   03/12/19  06:05    


 


Hct  43.1 % (30.3-42.9)  H  03/12/19  06:05    


 


MCV  95 fl (79-97)   03/12/19  06:05    


 


MCH  31 pg (28-32)   03/12/19  06:05    


 


MCHC  33 % (30-34)   03/12/19  06:05    


 


RDW  13.6 % (13.2-15.2)   03/12/19  06:05    


 


Plt Count  263 K/mm3 (140-440)   03/12/19  06:05    


 


Lymph % (Auto)  9.9 % (13.4-35.0)  L  03/12/19  06:05    


 


Mono % (Auto)  2.7 % (0.0-7.3)   03/12/19  06:05    


 


Eos % (Auto)  0.0 % (0.0-4.3)   03/12/19  06:05    


 


Baso % (Auto)  0.1 % (0.0-1.8)   03/12/19  06:05    


 


Lymph #  1.6 K/mm3 (1.2-5.4)   03/12/19  06:05    


 


Mono #  0.4 K/mm3 (0.0-0.8)   03/12/19  06:05    


 


Eos #  0.0 K/mm3 (0.0-0.4)   03/12/19  06:05    


 


Baso #  0.0 K/mm3 (0.0-0.1)   03/12/19  06:05    


 


Seg Neutrophils %  87.3 % (40.0-70.0)  H  03/12/19  06:05    


 


Seg Neutrophils #  14.1 K/mm3 (1.8-7.7)  H  03/12/19  06:05    


 


PT  12.9 Sec. (12.2-14.9)   03/11/19  04:31    


 


INR  0.92  (0.87-1.13)   03/11/19  04:31    


 


APTT  26.7 Sec. (24.2-36.6)   03/11/19  04:31    


 


Sodium  135 mmol/L (137-145)  L  03/12/19  06:05    


 


Potassium  4.6 mmol/L (3.6-5.0)  D 03/12/19  06:05    


 


Chloride  106.8 mmol/L ()   03/12/19  06:05    


 


Carbon Dioxide  24 mmol/L (22-30)   03/12/19  06:05    


 


Anion Gap  9 mmol/L  03/12/19  06:05    


 


BUN  14 mg/dL (7-17)   03/12/19  06:05    


 


Creatinine  0.7 mg/dL (0.7-1.2)   03/12/19  06:05    


 


Estimated GFR  > 60 ml/min  03/12/19  06:05    


 


BUN/Creatinine Ratio  20 %  03/12/19  06:05    


 


Glucose  112 mg/dL ()  H  03/12/19  06:05    


 


Calcium  8.8 mg/dL (8.4-10.2)   03/12/19  06:05    


 


Troponin T  < 0.010 ng/mL (0.00-0.029)   03/10/19  23:51    


 


NT-Pro-B Natriuret Pep  52.78 pg/mL (0-450)   03/10/19  23:51    


 


Urine Color  Colorless  (Yellow)   03/10/19  23:54    


 


Urine Turbidity  Clear  (Clear)   03/10/19  23:54    


 


Urine pH  6.0  (5.0-7.0)   03/10/19  23:54    


 


Ur Specific Gravity  1.004  (1.003-1.030)   03/10/19  23:54    


 


Urine Protein  <15 mg/dl mg/dL (Negative)   03/10/19  23:54    


 


Urine Glucose (UA)  Neg mg/dL (Negative)   03/10/19  23:54    


 


Urine Ketones  Neg mg/dL (Negative)   03/10/19  23:54    


 


Urine Blood  Mod  (Negative)   03/10/19  23:54    


 


Urine Nitrite  Neg  (Negative)   03/10/19  23:54    


 


Urine Bilirubin  Neg  (Negative)   03/10/19  23:54    


 


Urine Urobilinogen  < 2.0 mg/dL (<2.0)   03/10/19  23:54    


 


Ur Leukocyte Esterase  Neg  (Negative)   03/10/19  23:54    


 


Urine WBC (Auto)  1.0 /HPF (0.0-6.0)   03/10/19  23:54    


 


Urine RBC (Auto)  3.0 /HPF (0.0-6.0)   03/10/19  23:54    


 


U Epithel Cells (Auto)  2.0 /HPF (0-13.0)   03/10/19  23:54    


 


Urine Bacteria (Auto)  1+ /HPF (Negative)   03/10/19  23:54    


 


Amorphous Crystals  Few   03/10/19  23:54    


 


Urine Mucus  Few /HPF  03/10/19  23:54    


 


Urine HCG, Qual  Negative  (Negative)   03/10/19  23:54

## 2019-03-13 VITALS — SYSTOLIC BLOOD PRESSURE: 104 MMHG | DIASTOLIC BLOOD PRESSURE: 58 MMHG

## 2019-03-13 RX ADMIN — IPRATROPIUM BROMIDE SCH MG: 0.5 SOLUTION RESPIRATORY (INHALATION) at 13:27

## 2019-03-13 RX ADMIN — IPRATROPIUM BROMIDE SCH MG: 0.5 SOLUTION RESPIRATORY (INHALATION) at 08:27

## 2019-03-13 RX ADMIN — Medication SCH ML: at 09:47

## 2019-03-13 RX ADMIN — ENOXAPARIN SODIUM SCH MG: 100 INJECTION SUBCUTANEOUS at 05:47

## 2019-03-13 RX ADMIN — IPRATROPIUM BROMIDE SCH MG: 0.5 SOLUTION RESPIRATORY (INHALATION) at 00:29

## 2019-03-13 RX ADMIN — MORPHINE SULFATE PRN MG: 2 INJECTION, SOLUTION INTRAMUSCULAR; INTRAVENOUS at 00:37

## 2019-03-13 RX ADMIN — IPRATROPIUM BROMIDE SCH MG: 0.5 SOLUTION RESPIRATORY (INHALATION) at 04:55

## 2019-03-13 NOTE — DISCHARGE SUMMARY
Providers





- Providers


Date of Admission: 


03/11/19 06:01





Attending physician: 


KIM SOUZA MD





                                        





03/11/19 08:54


Consult to Physician [CONS] Routine 


   Comment: 


   Consulting Provider: DOMINICK SMITH


   Physician Instructions: 


   Reason For Exam: Respiratory failure











Primary care physician: 


PRIMARY CARE MD








Hospitalization


Reason for admission: asthma


Condition: Stable


Hospital course: 


33 -year-old woman with no medical problem, she is a  comes 

emergency room with complaints of shortness of breath and a nonproductive cough.

  She was seen out in the emergency room yesterday and was diagnosed with acute 

bronchitis.  She went to [facility] to  her prednisone, she became increasing 

more short of breath, feel as if she was, pass out.  She comes emergency room 

her evaluation, she was found to be in respiratory distress, wheezing, she will 

space on BiPAP, given steroids.  She denies OCP use,, recent surgery, she is 

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


She was diagnosed With Pulmonary Embolism. she was started on treatment with 

loveonx, steroids and later transitioned to Eliquis. She was also treated for 

reactive airway disease with pulmonologist assistance and advised to follow up 

with the pulmonary team


Side effect of medications was discussed in entirety and all questions answered.

 Tobacco cessation counselling was provided and she verbalized understanding. 








CT CHEST IMPRESSION: Right lower lobe pulmonary embolism 


Doppler US. IMPRESSION: No evidence of deep venous fibrosis of the bilateral 

lower extremities. 








Acute pulmonary emboli


Acute Respiratory failure secondary to Pulmonary embolisim


Reactive airways dysfunction syndrome


Acute bronchitis


Tobacco use disorder


Leukocytosis-likely secondary to steroids











Disposition: DC-01 TO HOME OR SELFCARE


Time spent for discharge: 35 mins





Core Measure Documentation





- Palliative Care


Palliative Care/ Comfort Measures: Not Applicable





- Core Measures


Any of the following diagnoses?: DVT/PE





- VTE Discharge Requirements


Deep Vein Thrombosis/Pulmonary Embolism Present on Admission: Yes


Has pt received <5 days of overlap therapy or INR<2.0: No


Anticoagulant overlap therapy prescribed at discharge: Yes





Exam





- Physical Exam


Narrative exam: 


General Apperance: The patient lying in bed, no acute distress, no increase in 

WOB


HEENT: Normocephalic, atraumatic.  Pupils equally round and reactive to light, 

EOMI, no sclericterus or JVD or thyromegaly or nodule.  , no carotid bruit, 

mucous membranes moist, no exudate or erythema


Heart: S1-S2, regular is rhythm


Lungs: Diminshed bilaterally, breathing comfortable


Abdomen: Positive bowel sounds, soft, nontender, nondistended, no organomegaly


Extremities: No edema cyanosis clubbing


Skin: no rash, nodule, warm and dry


Neuro: cranial nerves 2-12 intact, speech is fluent, motor/sensory intact











- Constitutional


Vitals: 


                                        











Temp Pulse Resp BP Pulse Ox


 


 98.3 F   105 H  20   96/45   92 


 


 03/13/19 10:23  03/13/19 10:26  03/13/19 10:26  03/13/19 10:26 03/13/19 10:26














Plan


Activity: advance as tolerated, fall precautions


Diet: regular


Special Instructions: record daily weights, record daily BP diary, smoking 

cessation


Follow up with: 


PRIMARY CARE,MD [Primary Care Provider] - 3-5 Days


ARLENE SCALES MD [Staff Physician] - 7 Days


Forms:  Work/School Release Form


Prescriptions: 


Apixaban [Eliquis] 10 mg PO Q12HR #81 tablet


Prednisone [predniSONE 10 mg (6-Day Pack, 21 Tabs)] 10 mg PO .TAPER #1 tab.ds.pk


Albuterol Sulfate [Ventolin HFA] 2 puff IH Q4H PRN #1 hfa.aer.ad


 PRN Reason: Shortness Of Breath

## 2019-03-13 NOTE — PROGRESS NOTE
Assessment and Plan





Patient awake. Resting on 4 litres O2. O2 saturation 92%.No complaint of chest 

pain or shortness of breath. Patient afebrile.





- Patient Problems


(1) Pulmonary embolism


Status: Acute   


Qualifiers: 


   Pulmonary embolism type: other   Chronicity: acute   Acute cor pulmonale 

presence: without acute cor pulmonale   Qualified Code(s): I26.99 - Other 

pulmonary embolism without acute cor pulmonale   


Plan to address problem: 


Patient  on S/C Lovenox.








(2) Reactive airways dysfunction syndrome


Status: Acute   


Plan to address problem: 


Atrovent aerosol treatments q 6 hours.


 Continue I/V solumedrol.








Subjective


Date of service: 03/13/19


Interval history: 





Patient awake. Resting on 4 litres O2. O2 saturation 92%.No complaint of chest 

pain or shortness of breath. Patient afebrile.





Objective


                               Vital Signs - 12hr











  03/13/19 03/13/19 03/13/19





  00:00 00:29 00:39


 


Temperature 98.1 F  


 


Pulse Rate 92 H  


 


Pulse Rate [  79 86





Anterior   





Bilateral   





Throughout]   


 


Respiratory 14  





Rate   


 


Respiratory  18 18





Rate [Anterior   





Bilateral   





Throughout]   


 


Blood Pressure   


 


Blood Pressure 107/62  





[Right]   


 


O2 Sat by Pulse 94  





Oximetry   














  03/13/19 03/13/19 03/13/19





  04:45 04:55 05:05


 


Temperature 98.0 F  


 


Pulse Rate 72  


 


Pulse Rate [  79 81





Anterior   





Bilateral   





Throughout]   


 


Respiratory 16  





Rate   


 


Respiratory  18 18





Rate [Anterior   





Bilateral   





Throughout]   


 


Blood Pressure 112/55  


 


Blood Pressure   





[Right]   


 


O2 Sat by Pulse 93  





Oximetry   














  03/13/19 03/13/19 03/13/19





  05:59 08:27 08:37


 


Temperature   


 


Pulse Rate 70  


 


Pulse Rate [  77 82





Anterior   





Bilateral   





Throughout]   


 


Respiratory   





Rate   


 


Respiratory  20 20





Rate [Anterior   





Bilateral   





Throughout]   


 


Blood Pressure   


 


Blood Pressure   





[Right]   


 


O2 Sat by Pulse   





Oximetry   














  03/13/19 03/13/19 03/13/19





  10:00 10:23 10:26


 


Temperature  98.3 F 


 


Pulse Rate 85  105 H


 


Pulse Rate [   





Anterior   





Bilateral   





Throughout]   


 


Respiratory   20





Rate   


 


Respiratory   





Rate [Anterior   





Bilateral   





Throughout]   


 


Blood Pressure   96/45


 


Blood Pressure   





[Right]   


 


O2 Sat by Pulse 94  92





Oximetry   











Constitutional: no acute distress, alert


Eyes: non-icteric


ENT: oropharynx moist


Neck: supple, no JVD


Ascultation: Bilateral: diminished breath sounds


Cardiovascular: regular rate and rhythm


Gastrointestinal: normoactive bowel sounds, soft, non-tender


Integumentary: normal


Extremities: no cyanosis, no edema


Neurologic: normal mental status, non-focal exam, pupils equal and round, CN II-

XII normal


Psychiatric: depressed


CBC and BMP: 


                                 03/12/19 06:05





                                 03/12/19 06:05


ABG, PT/INR, D-dimer: 


PT/INR, D-dimer











PT  12.9 Sec. (12.2-14.9)   03/11/19  04:31    


 


INR  0.92  (0.87-1.13)   03/11/19  04:31    








Abnormal lab findings: 


                                  Abnormal Labs











  03/11/19 03/11/19 03/12/19





  00:48 00:48 06:05


 


WBC    16.2 H


 


Hgb  14.8 H  


 


Hct  43.0 H   43.1 H


 


MCH  33 H  


 


Lymph % (Auto)  9.9 L   9.9 L


 


Lymph #  1.1 L  


 


Seg Neutrophils %  88.1 H   87.3 H


 


Seg Neutrophils #  9.5 H   14.1 H


 


Sodium   136 L 


 


Carbon Dioxide   18 L D 


 


Glucose   169 H 














  03/12/19





  06:05


 


WBC 


 


Hgb 


 


Hct 


 


MCH 


 


Lymph % (Auto) 


 


Lymph # 


 


Seg Neutrophils % 


 


Seg Neutrophils # 


 


Sodium  135 L


 


Carbon Dioxide 


 


Glucose  112 H